# Patient Record
Sex: FEMALE | Race: WHITE | NOT HISPANIC OR LATINO
[De-identification: names, ages, dates, MRNs, and addresses within clinical notes are randomized per-mention and may not be internally consistent; named-entity substitution may affect disease eponyms.]

---

## 2017-05-19 ENCOUNTER — APPOINTMENT (OUTPATIENT)
Dept: VASCULAR SURGERY | Facility: CLINIC | Age: 47
End: 2017-05-19
Payer: COMMERCIAL

## 2017-05-19 PROCEDURE — 36468 NJX SCLRSNT SPIDER VEINS: CPT

## 2017-05-19 PROCEDURE — 93970 EXTREMITY STUDY: CPT

## 2017-12-13 ENCOUNTER — APPOINTMENT (OUTPATIENT)
Dept: VASCULAR SURGERY | Facility: CLINIC | Age: 47
End: 2017-12-13
Payer: COMMERCIAL

## 2017-12-13 VITALS — OXYGEN SATURATION: 100 % | DIASTOLIC BLOOD PRESSURE: 79 MMHG | HEART RATE: 48 BPM | SYSTOLIC BLOOD PRESSURE: 118 MMHG

## 2017-12-13 PROCEDURE — 99214 OFFICE O/P EST MOD 30 MIN: CPT

## 2018-02-28 ENCOUNTER — APPOINTMENT (OUTPATIENT)
Dept: VASCULAR SURGERY | Facility: CLINIC | Age: 48
End: 2018-02-28
Payer: COMMERCIAL

## 2018-02-28 PROCEDURE — 36475 ENDOVENOUS RF 1ST VEIN: CPT | Mod: LT

## 2018-03-02 ENCOUNTER — APPOINTMENT (OUTPATIENT)
Dept: VASCULAR SURGERY | Facility: CLINIC | Age: 48
End: 2018-03-02
Payer: COMMERCIAL

## 2018-03-02 PROCEDURE — 36471 NJX SCLRSNT MLT INCMPTNT VN: CPT

## 2018-03-02 PROCEDURE — 93971 EXTREMITY STUDY: CPT

## 2018-05-04 ENCOUNTER — APPOINTMENT (OUTPATIENT)
Dept: VASCULAR SURGERY | Facility: CLINIC | Age: 48
End: 2018-05-04
Payer: SELF-PAY

## 2018-05-04 PROCEDURE — 76942 ECHO GUIDE FOR BIOPSY: CPT | Mod: 26,NC

## 2018-05-04 PROCEDURE — 36468 NJX SCLRSNT SPIDER VEINS: CPT

## 2018-05-04 RX ORDER — CEPHALEXIN 500 MG/1
500 CAPSULE ORAL
Qty: 21 | Refills: 0 | Status: ACTIVE | COMMUNITY
Start: 2018-03-26

## 2018-05-04 RX ORDER — HYDROCODONE BITARTRATE AND ACETAMINOPHEN 7.5; 3 MG/1; MG/1
7.5-3 TABLET ORAL
Qty: 30 | Refills: 0 | Status: ACTIVE | COMMUNITY
Start: 2018-03-26

## 2018-05-04 RX ORDER — DIAZEPAM 5 MG/1
5 TABLET ORAL
Qty: 5 | Refills: 0 | Status: ACTIVE | COMMUNITY
Start: 2018-03-26

## 2018-05-04 RX ORDER — CEFADROXIL 500 MG/1
500 CAPSULE ORAL
Qty: 14 | Refills: 0 | Status: ACTIVE | COMMUNITY
Start: 2017-12-01

## 2018-05-04 RX ORDER — CIPROFLOXACIN HYDROCHLORIDE 750 MG/1
750 TABLET, FILM COATED ORAL
Qty: 14 | Refills: 0 | Status: ACTIVE | COMMUNITY
Start: 2017-12-26

## 2018-06-15 ENCOUNTER — APPOINTMENT (OUTPATIENT)
Dept: VASCULAR SURGERY | Facility: CLINIC | Age: 48
End: 2018-06-15
Payer: SELF-PAY

## 2018-06-15 PROCEDURE — 36468 NJX SCLRSNT SPIDER VEINS: CPT

## 2018-06-15 RX ORDER — EFINACONAZOLE 100 MG/ML
10 SOLUTION TOPICAL
Qty: 4 | Refills: 0 | Status: ACTIVE | COMMUNITY
Start: 2018-05-18

## 2018-10-05 ENCOUNTER — APPOINTMENT (OUTPATIENT)
Dept: VASCULAR SURGERY | Facility: CLINIC | Age: 48
End: 2018-10-05
Payer: COMMERCIAL

## 2018-10-05 PROCEDURE — 37765 STAB PHLEB VEINS XTR 10-20: CPT | Mod: LT

## 2018-10-10 ENCOUNTER — APPOINTMENT (OUTPATIENT)
Dept: VASCULAR SURGERY | Facility: CLINIC | Age: 48
End: 2018-10-10
Payer: COMMERCIAL

## 2018-10-10 DIAGNOSIS — I83.819 VARICOSE VEINS OF UNSPECIFIED LOWER EXTREMITY WITH PAIN: ICD-10-CM

## 2018-10-10 PROCEDURE — 99024 POSTOP FOLLOW-UP VISIT: CPT

## 2019-06-21 ENCOUNTER — APPOINTMENT (OUTPATIENT)
Dept: VASCULAR SURGERY | Facility: CLINIC | Age: 49
End: 2019-06-21
Payer: SELF-PAY

## 2019-06-21 PROCEDURE — 36468 NJX SCLRSNT SPIDER VEINS: CPT

## 2019-06-21 NOTE — PROCEDURE
[FreeTextEntry1] : Bilateral lower extremities spider veins [FreeTextEntry3] : Procedure performed: Sclerotherapy\par \par Indications: spider veins on bilateral lower extremities\par \par Procedure: Consent was obtained. Patient was placed in comfortable position, legs were prepped with alcohol, 1% sotradecal mixed with normal saline was injected into the veins and pressure dressing with cotton balls and tape was applied. Patient tolerated the procedure well. Legs were wrapped with ACE bandage, patient advised to leave bandage on for 24 hours. FU in 2-3 weeks.  [FreeTextEntry2] : Spider veins

## 2019-07-12 ENCOUNTER — APPOINTMENT (OUTPATIENT)
Dept: VASCULAR SURGERY | Facility: CLINIC | Age: 49
End: 2019-07-12
Payer: SELF-PAY

## 2019-07-12 PROCEDURE — 36468 NJX SCLRSNT SPIDER VEINS: CPT

## 2019-07-12 NOTE — PROCEDURE
[FreeTextEntry3] : Procedure performed: Sclerotherapy\par \par Indications: spider veins\par \par Procedure: Consent was obtained. Patient was placed in comfortable position, legs were prepped with alcohol, 1% sotradecal mixed with normal saline was injected into the veins and pressure dressing with cotton balls and tape was applied. Patient tolerated the procedure well. Legs were wrapped with ACE bandage, patient advised to leave bandage on for 24 hours. FU in 2-3 weeks. \par \par Patient advised if she needs additional treatment, will give her at a reduction of the price. \par

## 2021-05-14 ENCOUNTER — APPOINTMENT (OUTPATIENT)
Dept: VASCULAR SURGERY | Facility: CLINIC | Age: 51
End: 2021-05-14
Payer: SELF-PAY

## 2021-05-14 PROCEDURE — 36468 NJX SCLRSNT SPIDER VEINS: CPT

## 2021-05-14 NOTE — PROCEDURE
[FreeTextEntry1] : Bilateral LE sclerotherapy [FreeTextEntry2] : Spider veins [FreeTextEntry3] : Procedure performed: Sclerotherapy\par \par Indications: spider veins\par \par Procedure: Consent was obtained. Patient was placed in comfortable position, legs were prepped with alcohol, 1% sotradecal mixed with normal saline was injected into the veins and pressure dressing with cotton balls and tape was applied. Patient tolerated the procedure well. Legs were wrapped with ACE bandage, patient advised to leave bandage on for 24 hours. FU in 2-3 weeks.

## 2021-05-28 ENCOUNTER — APPOINTMENT (OUTPATIENT)
Dept: VASCULAR SURGERY | Facility: CLINIC | Age: 51
End: 2021-05-28
Payer: SELF-PAY

## 2021-05-28 PROCEDURE — 36468 NJX SCLRSNT SPIDER VEINS: CPT

## 2021-06-08 NOTE — PROCEDURE
[FreeTextEntry3] : Procedure performed: Sclerotherapy\par \par Indications: spider veins\par \par Procedure: Consent was obtained. Patient was placed in comfortable position, legs were prepped with alcohol, 1% sotradecal mixed with normal saline was injected into the veins and pressure dressing with cotton balls and tape was applied. Patient tolerated the procedure well. Legs were wrapped with ACE bandage, patient advised to leave bandage on for 24 hours. FU in 2-3 weeks.

## 2021-06-08 NOTE — ADDENDUM
[FreeTextEntry1] : This note was written by Malissa Fairchild NP acting as scribe for Dr.Gary Mich MOYER.\par \par I, Dr. Kleber Epps  have read and attest that all the information, medical decision making and discharge instructions within are true and accurate.

## 2021-06-11 ENCOUNTER — APPOINTMENT (OUTPATIENT)
Dept: VASCULAR SURGERY | Facility: CLINIC | Age: 51
End: 2021-06-11
Payer: SELF-PAY

## 2021-06-11 PROCEDURE — 36468 NJX SCLRSNT SPIDER VEINS: CPT

## 2021-06-11 NOTE — PROCEDURE
[FreeTextEntry3] : Procedure performed: Sclerotherapy\par \par Indications: spider veins bilateral lower extremities\par \par Procedure: Consent was obtained. Patient was placed in comfortable position, legs were prepped with alcohol, 1% sotradecal mixed with normal saline was injected into the veins and pressure dressing with cotton balls and tape was applied. Patient tolerated the procedure well. Legs were wrapped with ACE bandage, patient advised to leave bandage on for 24 hours. FU in 2-3 weeks.

## 2021-06-25 ENCOUNTER — APPOINTMENT (OUTPATIENT)
Dept: VASCULAR SURGERY | Facility: CLINIC | Age: 51
End: 2021-06-25
Payer: SELF-PAY

## 2021-06-25 PROCEDURE — 36468 NJX SCLRSNT SPIDER VEINS: CPT

## 2021-06-25 NOTE — PROCEDURE
[FreeTextEntry3] : Procedure performed: Sclerotherapy bilateral lower extremities\par \par Indications: spider veins\par \par Procedure: Consent was obtained. Patient was placed in comfortable position, legs were prepped with alcohol, 1% sotradecal mixed with normal saline was injected into the veins and pressure dressing with cotton balls and tape was applied. Patient tolerated the procedure well. Legs were wrapped with ACE bandage, patient advised to leave bandage on for 24 hours. FU in 2-3 weeks.

## 2021-07-02 ENCOUNTER — APPOINTMENT (OUTPATIENT)
Dept: VASCULAR SURGERY | Facility: CLINIC | Age: 51
End: 2021-07-02
Payer: SELF-PAY

## 2021-07-02 PROCEDURE — 36468 NJX SCLRSNT SPIDER VEINS: CPT

## 2021-07-02 NOTE — PROCEDURE
[FreeTextEntry3] : Procedure performed: Sclerotherapy bilateral LEs\par \par Indications: spider veins\par \par Procedure: Consent was obtained. Patient was placed in comfortable position, legs were prepped with alcohol, 1% sotradecal mixed with normal saline was injected into the veins and pressure dressing with cotton balls and tape was applied. Patient tolerated the procedure well. Legs were wrapped with ACE bandage, patient advised to leave bandage on for 24 hours. FU in 2-3 weeks.

## 2022-05-13 ENCOUNTER — APPOINTMENT (OUTPATIENT)
Dept: VASCULAR SURGERY | Facility: CLINIC | Age: 52
End: 2022-05-13
Payer: SELF-PAY

## 2022-05-13 PROCEDURE — 36468 NJX SCLRSNT SPIDER VEINS: CPT

## 2022-06-03 ENCOUNTER — APPOINTMENT (OUTPATIENT)
Dept: VASCULAR SURGERY | Facility: CLINIC | Age: 52
End: 2022-06-03
Payer: SELF-PAY

## 2022-06-03 VITALS
SYSTOLIC BLOOD PRESSURE: 99 MMHG | HEIGHT: 68 IN | DIASTOLIC BLOOD PRESSURE: 66 MMHG | HEART RATE: 70 BPM | BODY MASS INDEX: 26.52 KG/M2 | WEIGHT: 175 LBS

## 2022-06-03 PROCEDURE — 36468 NJX SCLRSNT SPIDER VEINS: CPT

## 2022-06-03 RX ORDER — ALBUTEROL SULFATE 90 UG/1
108 (90 BASE) INHALANT RESPIRATORY (INHALATION)
Qty: 7 | Refills: 0 | Status: ACTIVE | COMMUNITY
Start: 2022-03-11

## 2022-06-03 RX ORDER — PROGESTERONE 100 MG/1
100 CAPSULE ORAL
Qty: 12 | Refills: 0 | Status: ACTIVE | COMMUNITY
Start: 2022-04-29

## 2022-06-03 RX ORDER — ESTRADIOL 0.75 MG/1.25G
0.75 MG/1.25 GM GEL, METERED TOPICAL
Qty: 50 | Refills: 0 | Status: ACTIVE | COMMUNITY
Start: 2021-09-09

## 2022-06-03 RX ORDER — AMOXICILLIN AND CLAVULANATE POTASSIUM 875; 125 MG/1; MG/1
875-125 TABLET, COATED ORAL
Qty: 14 | Refills: 0 | Status: ACTIVE | COMMUNITY
Start: 2022-02-08

## 2022-06-03 NOTE — PROCEDURE
[FreeTextEntry3] : Procedure performed: Sclerotherapy bilateral LEs\par \par Indications: bilateral LEs spider veins\par \par Procedure: Consent was obtained. Patient was placed in comfortable position, legs were prepped with alcohol, 1% sotradecal mixed with normal saline was injected into the veins and pressure dressing with cotton balls and tape was applied. Patient tolerated the procedure well. Legs were wrapped with ACE bandage, patient advised to leave bandage on for 24 hours. FU in 2-3 weeks.

## 2022-06-17 ENCOUNTER — APPOINTMENT (OUTPATIENT)
Dept: VASCULAR SURGERY | Facility: CLINIC | Age: 52
End: 2022-06-17
Payer: SELF-PAY

## 2022-06-17 ENCOUNTER — NON-APPOINTMENT (OUTPATIENT)
Age: 52
End: 2022-06-17

## 2022-06-24 ENCOUNTER — APPOINTMENT (OUTPATIENT)
Dept: VASCULAR SURGERY | Facility: CLINIC | Age: 52
End: 2022-06-24
Payer: SELF-PAY

## 2022-06-24 VITALS
WEIGHT: 175 LBS | DIASTOLIC BLOOD PRESSURE: 70 MMHG | BODY MASS INDEX: 26.52 KG/M2 | HEART RATE: 74 BPM | SYSTOLIC BLOOD PRESSURE: 100 MMHG | HEIGHT: 68 IN

## 2022-06-24 PROCEDURE — 36478 ENDOVENOUS LASER 1ST VEIN: CPT

## 2022-06-24 PROCEDURE — 36468 NJX SCLRSNT SPIDER VEINS: CPT

## 2023-05-31 ENCOUNTER — APPOINTMENT (OUTPATIENT)
Dept: VASCULAR SURGERY | Facility: CLINIC | Age: 53
End: 2023-05-31
Payer: SELF-PAY

## 2023-05-31 PROCEDURE — 36468 NJX SCLRSNT SPIDER VEINS: CPT

## 2023-05-31 NOTE — PROCEDURE
[FreeTextEntry3] : Procedure performed: Sclerotherapy (bilateral calves)\par \par Indications: spider veins\par \par Procedure: Consent was obtained. Patient was placed in comfortable position, legs were prepped with alcohol, 1% sotradecal mixed with normal saline was injected into the veins and pressure dressing with cotton balls and tape was applied. Patient tolerated the procedure well. Legs were wrapped with ACE bandage, patient advised to leave bandage on for 24 hours. FU in 2-3 weeks. \par

## 2023-07-26 ENCOUNTER — APPOINTMENT (OUTPATIENT)
Dept: VASCULAR SURGERY | Facility: CLINIC | Age: 53
End: 2023-07-26
Payer: SELF-PAY

## 2023-07-26 PROCEDURE — 36468 NJX SCLRSNT SPIDER VEINS: CPT

## 2023-07-26 NOTE — PROCEDURE
[FreeTextEntry3] : Procedure performed: Sclerotherapy (posterior thighs and calves)\par \par Indications: spider veins\par \par Procedure: Consent was obtained. Patient was placed in comfortable position, legs were prepped with alcohol, 1% sotradecal mixed with normal saline was injected into the veins and pressure dressing with cotton balls and tape was applied. Patient tolerated the procedure well. Legs were wrapped with ACE bandage, patient advised to leave bandage on for 24 hours. FU in 2-3 weeks.

## 2023-09-06 ENCOUNTER — APPOINTMENT (OUTPATIENT)
Dept: VASCULAR SURGERY | Facility: CLINIC | Age: 53
End: 2023-09-06
Payer: SELF-PAY

## 2023-09-06 DIAGNOSIS — I86.8 VARICOSE VEINS OF OTHER SPECIFIED SITES: ICD-10-CM

## 2023-09-06 PROCEDURE — 36468 NJX SCLRSNT SPIDER VEINS: CPT

## 2023-09-06 NOTE — ADDENDUM
[FreeTextEntry1] : This note was written by Malissa Fairchild NP acting as scribe for Dr.Gary Mich GANT  I, Dr. Kleber Epps  have read and attest that all the information, medical decision making and discharge instructions within are true and accurate.

## 2023-09-06 NOTE — PROCEDURE
[FreeTextEntry3] : Procedure performed: Sclerotherapy (bilateral lower extremities - shins and R lateral plus L medial calf)  Indications: spider veins  Procedure: Consent was obtained. Patient was placed in comfortable position, legs were prepped with alcohol, 1% sotradecal mixed with normal saline was injected into the veins and pressure dressing with cotton balls and tape was applied. Patient tolerated the procedure well. Legs were wrapped with ACE bandage, patient advised to leave bandage on for 24 hours. FU in 2-3 weeks.   Patient advised if she needs additional treatment, will give her at a reduction of the price.